# Patient Record
Sex: MALE | Race: OTHER | Employment: FULL TIME | URBAN - METROPOLITAN AREA
[De-identification: names, ages, dates, MRNs, and addresses within clinical notes are randomized per-mention and may not be internally consistent; named-entity substitution may affect disease eponyms.]

---

## 2021-09-28 PROCEDURE — 99284 EMERGENCY DEPT VISIT MOD MDM: CPT

## 2021-09-28 PROCEDURE — 96374 THER/PROPH/DIAG INJ IV PUSH: CPT

## 2021-09-29 ENCOUNTER — HOSPITAL ENCOUNTER (EMERGENCY)
Facility: HOSPITAL | Age: 41
Discharge: HOME | End: 2021-09-29
Attending: EMERGENCY MEDICINE

## 2021-09-29 ENCOUNTER — APPOINTMENT (OUTPATIENT)
Dept: CT IMAGING | Facility: HOSPITAL | Age: 41
End: 2021-09-29
Attending: EMERGENCY MEDICINE

## 2021-09-29 VITALS
TEMPERATURE: 97 F | SYSTOLIC BLOOD PRESSURE: 139 MMHG | OXYGEN SATURATION: 98 % | DIASTOLIC BLOOD PRESSURE: 96 MMHG | WEIGHT: 187 LBS | HEART RATE: 55 BPM | HEIGHT: 63 IN | BODY MASS INDEX: 33.13 KG/M2 | RESPIRATION RATE: 16 BRPM

## 2021-09-29 DIAGNOSIS — N20.1 URETEROLITHIASIS: Primary | ICD-10-CM

## 2021-09-29 PROCEDURE — 80076 HEPATIC FUNCTION PANEL: CPT | Performed by: EMERGENCY MEDICINE

## 2021-09-29 PROCEDURE — 85025 COMPLETE CBC W/AUTO DIFF WBC: CPT | Performed by: EMERGENCY MEDICINE

## 2021-09-29 PROCEDURE — 83690 ASSAY OF LIPASE: CPT | Performed by: EMERGENCY MEDICINE

## 2021-09-29 PROCEDURE — 81001 URINALYSIS AUTO W/SCOPE: CPT | Performed by: EMERGENCY MEDICINE

## 2021-09-29 PROCEDURE — 80048 BASIC METABOLIC PNL TOTAL CA: CPT | Performed by: EMERGENCY MEDICINE

## 2021-09-29 PROCEDURE — 74176 CT ABD & PELVIS W/O CONTRAST: CPT | Performed by: EMERGENCY MEDICINE

## 2021-09-29 RX ORDER — TAMSULOSIN HYDROCHLORIDE 0.4 MG/1
0.4 CAPSULE ORAL DAILY
Qty: 20 CAPSULE | Refills: 0 | Status: SHIPPED | OUTPATIENT
Start: 2021-09-29 | End: 2021-10-19

## 2021-09-29 RX ORDER — HYDROCODONE BITARTRATE AND ACETAMINOPHEN 5; 325 MG/1; MG/1
1 TABLET ORAL EVERY 6 HOURS PRN
Qty: 15 TABLET | Refills: 0 | Status: SHIPPED | OUTPATIENT
Start: 2021-09-29

## 2021-09-29 RX ORDER — KETOROLAC TROMETHAMINE 15 MG/ML
15 INJECTION, SOLUTION INTRAMUSCULAR; INTRAVENOUS ONCE
Status: COMPLETED | OUTPATIENT
Start: 2021-09-29 | End: 2021-09-29

## 2021-09-29 RX ORDER — KETOROLAC TROMETHAMINE 10 MG/1
10 TABLET, FILM COATED ORAL EVERY 6 HOURS PRN
Qty: 20 TABLET | Refills: 0 | Status: SHIPPED | OUTPATIENT
Start: 2021-09-29 | End: 2021-10-04

## 2021-09-29 NOTE — CM/SW NOTE
Radiology CD of Abdomen/Pelvis provided to patient and Sukhi Runner in [address] [place] [address] ER Registration again translated for [facility]. informing patient and significant other on importance of bringing this CD with him to [facility]. Patient and significant other v/u.     Dr. Rachel Sellers

## 2021-09-29 NOTE — CM/SW NOTE
Called by Dr. Kesha Okeefe to assist patient whom is self pay with getting outpatient F/U with a urologist as patient has a large kidney stone which is 12mm in length and 6x5mm in axial dimensions per CT results.      ERCM met with patient and significant other

## 2021-09-29 NOTE — ED PROVIDER NOTES
Patient Seen in: [facility] [place] AND [facility] Emergency Department    History   Patient presents with:  Abdomen/Flank Pain      HPI    The patient presents to the ED complaining of pain to his suprapubic abdomen and right lateral abdomen starting 4 days ago.   He st atraumatic. Eyes:      General:         Right eye: No discharge. Left eye: No discharge. Conjunctiva/sclera: Conjunctivae normal.   Neck:      Trachea: No tracheal deviation. Cardiovascular:      Rate and Rhythm: Normal rate.    Pulmonary: individual orders.    RAINBOW DRAW LAVENDER   RAINBOW DRAW LIGHT GREEN   RAINBOW DRAW GOLD   CBC W/ DIFFERENTIAL         Imaging Results Available and Reviewed while in ED: Preliminary Radiology Report  [facility], [address]  [phone] - Phone    Bola Antony Considerations: Ureterolithiasis, pyelonephritis, UTI    Medical Record Review: I personally reviewed available prior medical records for any recent pertinent discharge summaries, testing, and procedures and reviewed those reports.     Complicating Factors: Pain.  Qty: 15 tablet Refills: 0